# Patient Record
Sex: FEMALE | Race: WHITE | NOT HISPANIC OR LATINO | Employment: UNEMPLOYED | URBAN - METROPOLITAN AREA
[De-identification: names, ages, dates, MRNs, and addresses within clinical notes are randomized per-mention and may not be internally consistent; named-entity substitution may affect disease eponyms.]

---

## 2017-04-13 ENCOUNTER — GENERIC CONVERSION - ENCOUNTER (OUTPATIENT)
Dept: OTHER | Facility: OTHER | Age: 52
End: 2017-04-13

## 2018-01-01 ENCOUNTER — APPOINTMENT (OUTPATIENT)
Dept: RADIOLOGY | Facility: CLINIC | Age: 53
End: 2018-01-01
Payer: COMMERCIAL

## 2018-01-01 ENCOUNTER — OFFICE VISIT (OUTPATIENT)
Dept: URGENT CARE | Facility: CLINIC | Age: 53
End: 2018-01-01
Payer: COMMERCIAL

## 2018-01-01 ENCOUNTER — TRANSCRIBE ORDERS (OUTPATIENT)
Dept: RADIOLOGY | Facility: CLINIC | Age: 53
End: 2018-01-01

## 2018-01-01 ENCOUNTER — HOSPITAL ENCOUNTER (EMERGENCY)
Facility: HOSPITAL | Age: 53
End: 2018-07-20
Attending: EMERGENCY MEDICINE | Admitting: EMERGENCY MEDICINE
Payer: COMMERCIAL

## 2018-01-01 VITALS — OXYGEN SATURATION: 99 % | HEART RATE: 102 BPM

## 2018-01-01 VITALS
TEMPERATURE: 97.4 F | RESPIRATION RATE: 16 BRPM | SYSTOLIC BLOOD PRESSURE: 142 MMHG | DIASTOLIC BLOOD PRESSURE: 79 MMHG | OXYGEN SATURATION: 97 % | HEART RATE: 81 BPM

## 2018-01-01 DIAGNOSIS — K21.00 GASTROESOPHAGEAL REFLUX DISEASE WITH ESOPHAGITIS: Primary | ICD-10-CM

## 2018-01-01 DIAGNOSIS — R06.02 SOB (SHORTNESS OF BREATH): Primary | ICD-10-CM

## 2018-01-01 DIAGNOSIS — J44.9 COPD (CHRONIC OBSTRUCTIVE PULMONARY DISEASE) (HCC): ICD-10-CM

## 2018-01-01 DIAGNOSIS — R06.02 SOB (SHORTNESS OF BREATH): ICD-10-CM

## 2018-01-01 DIAGNOSIS — I46.8 CARDIAC ARREST DUE TO RESPIRATORY DISORDER (HCC): Primary | ICD-10-CM

## 2018-01-01 DIAGNOSIS — J98.9 CARDIAC ARREST DUE TO RESPIRATORY DISORDER (HCC): Primary | ICD-10-CM

## 2018-01-01 PROCEDURE — 99213 OFFICE O/P EST LOW 20 MIN: CPT | Performed by: FAMILY MEDICINE

## 2018-01-01 PROCEDURE — 92950 HEART/LUNG RESUSCITATION CPR: CPT

## 2018-01-01 PROCEDURE — 71046 X-RAY EXAM CHEST 2 VIEWS: CPT

## 2018-01-01 PROCEDURE — 96375 TX/PRO/DX INJ NEW DRUG ADDON: CPT

## 2018-01-01 PROCEDURE — 99285 EMERGENCY DEPT VISIT HI MDM: CPT

## 2018-01-01 PROCEDURE — 96374 THER/PROPH/DIAG INJ IV PUSH: CPT

## 2018-01-01 RX ORDER — ALBUTEROL SULFATE 2.5 MG/3ML
SOLUTION RESPIRATORY (INHALATION)
COMMUNITY
Start: 2015-12-23

## 2018-01-01 RX ORDER — PREDNISONE 10 MG/1
TABLET ORAL DAILY
COMMUNITY

## 2018-01-01 RX ORDER — EPINEPHRINE 0.1 MG/ML
SYRINGE (ML) INJECTION CODE/TRAUMA/SEDATION MEDICATION
Status: COMPLETED | OUTPATIENT
Start: 2018-01-01 | End: 2018-01-01

## 2018-01-01 RX ORDER — ESOMEPRAZOLE MAGNESIUM 40 MG/1
40 CAPSULE, DELAYED RELEASE ORAL
Qty: 42 CAPSULE | Refills: 0 | OUTPATIENT
Start: 2018-01-01

## 2018-01-01 RX ORDER — AMIODARONE HYDROCHLORIDE 50 MG/ML
INJECTION, SOLUTION INTRAVENOUS CODE/TRAUMA/SEDATION MEDICATION
Status: COMPLETED | OUTPATIENT
Start: 2018-01-01 | End: 2018-01-01

## 2018-01-01 RX ORDER — MOMETASONE FUROATE 50 UG/1
SPRAY, METERED NASAL
COMMUNITY
Start: 2015-12-10

## 2018-01-01 RX ADMIN — EPINEPHRINE 1 MG: 0.1 INJECTION, SOLUTION ENDOTRACHEAL; INTRACARDIAC; INTRAVENOUS at 08:13

## 2018-01-01 RX ADMIN — EPINEPHRINE 1 MG: 0.1 INJECTION, SOLUTION ENDOTRACHEAL; INTRACARDIAC; INTRAVENOUS at 08:19

## 2018-01-01 RX ADMIN — EPINEPHRINE 1 MG: 0.1 INJECTION, SOLUTION ENDOTRACHEAL; INTRACARDIAC; INTRAVENOUS at 08:06

## 2018-01-01 RX ADMIN — SODIUM BICARBONATE 50 MEQ: 84 INJECTION, SOLUTION INTRAVENOUS at 08:06

## 2018-01-01 RX ADMIN — AMIODARONE HYDROCHLORIDE 300 MG: 50 INJECTION, SOLUTION INTRAVENOUS at 08:18

## 2018-01-01 RX ADMIN — EPINEPHRINE 1 MG: 0.1 INJECTION, SOLUTION ENDOTRACHEAL; INTRACARDIAC; INTRAVENOUS at 08:22

## 2018-01-01 RX ADMIN — SODIUM BICARBONATE 50 MEQ: 84 INJECTION, SOLUTION INTRAVENOUS at 08:16

## 2018-04-21 NOTE — PROGRESS NOTES
[facility] - Patient Visit Note  Glynn Seip 45 y o  female MRN: [id number]      Assessment / Plan:  Gastroesophageal reflux disease with esophagitis [K21 0]  1  Gastroesophageal reflux disease with esophagitis  esomeprazole (NexIUM) 40 MG capsule     Reason For Visit / Chief Complaint  Chief Complaint   Patient presents with    Heartburn      x 3 hours             Hailey Rucker Discussion:  Patient will take Nexium generic 40 milligrams twice a day taper back her steroid to [id number] and getting contact with her primary care physician during that time  She will avoid all acidic type foods anything that is over hot and to cold  She will have a gastroscope ventrally even though she had a nasal endoscopy was explained to her that the gastroscope is much more diagnostic  She will be speaking to her primary care doctor about all of the same her  was with her and understands all the instructions given     HPI:  Glynn Seip is a 45 y o  female Patient           Who  Presents with upper epigastric discomfort and GERD symptoms  Patient states she has had a history of GERD in the past   In fact in the last 2 days has been getting hiccups every time she eats  Today prior to coming to the office she had 4 relates 2 Tums and had been drinking baking soda water for relief of the esophagitis associated with her LEXY D  When in the office she was given Maalox x2 containers with good relief  She stated she had never tried a proton pump inhibitor or any of the antacids in prescription  She had been to her doctor this same day and in fact did not have the GERD at that time she was being seen for her breathing problems and by a pulmonologist but then she added that she has been on steroids [id number] for the last 10 days  She notes that this is when her GERD started to act up         Problem List     Hypoglycemia Historical Information   Past Medical History:   Diagnosis Date    Allergic     Asthma     GERD (gastroesophageal reflux disease)      No past surgical history on file  Social History   History   Alcohol use Not on file     History   Drug use: Unknown     History   Smoking Status    Not on file   Smokeless Tobacco    Not on file     No family history on file        ALLERGIES:         Allergies   Allergen Reactions    Ciprofloxacin     Penicillin G Hives    Penicillins     Shellfish-Derived Products Hives       MEDS:    Current Outpatient Prescriptions:     albuterol (2 5 mg/3 mL) 0 083 % nebulizer solution, , Disp: , Rfl:     mometasone (NASONEX) 50 mcg/act nasal spray, , Disp: , Rfl:     predniSONE 10 mg tablet, Take by mouth daily, Disp: , Rfl:     esomeprazole (NexIUM) 40 MG capsule, Take 1 capsule (40 mg total) by mouth 2 (two) times a day before meals, Disp: 42 capsule, Rfl: 0    FACILITY ADMINISTERED MEDS:        REVIEW OF SYSTEMS    GENERAL: NEGATIVE for:  Generalized Fatigue                             Chills                              Fever                             Myalgias     OPTHALMIC: NEGATIVE for:  Diplopia                            Scotomata                            Visual Changes                            Blurred Vision     ENT:  EARS NEGATIVE for:  Hearing Difficulty                            Tinnitus                            Vertigo                            Dizziness                            Ear Pain                            Ear Drainage               NOSE NEGATIVE for:  Nasal Congestion                            Nasal Discharge                            Sinus Pain / Pressure               THROAT Positive for:  Sore Throat / Throat Pain                            Difficulty Swallowing     RESPIRATORY: Positive for:  Cough                            Wheezing                                 CARDIOVASCULAR: NEGATIVE for:  Chest Pain SOB (cardiac Related)                             Dyspnea on Exertion                             Orthopnea                             PND                             Leg Edema                             Palpitations                               Irregularities/rythym                       CURRENT VITALS:   Blood Pressure: 142/79 (04/20/18 1734)  Pulse: 81 (04/20/18 1734)  Temperature: (!) 97 4 °F (36 3 °C) (04/20/18 1734)  Respirations: 16 (04/20/18 1734)  SpO2: 97 % (04/20/18 1734)  /79   Pulse 81   Temp (!) 97 4 °F (36 3 °C)   Resp 16   SpO2 97%       PHYSICAL EXAM:         General Appearance:    Alert, cooperative, no apparent distress, appears stated age     Oriented x3    Head:    Normocephalic, without obvious abnormality, atraumatic   Eyes:      EOM's intact,      JUNIOR,        conjunctiva/corneas clear,          fundi not visualized well   Ears:     Normal external ear canals     Tm right side  Normal     Tm left side    Normal       Nose:   Nares normal externally, septum midline,     mucosa normal,     No anterior drainage         Sinuses   with out   tenderness to palpation / percussion     Throat:   Lips, mucosa, and tongue normal       Anterior pharynx   Normal      Posterior pharynx   Normal      No exudate obvious       Neck:   Supple, symmetrical, trachea midline and moveable    Normal thyroid click present    No carotid bruits appreciated        Lymphatics:     Adenopathy in anterior cervical chain  Normal    Adenopathy in posterior cervical chain   Normal     Lungs:   Scattered rhonchi and wheeze are noted in the lungs fields bilaterally     Heart[de-identified]    Regular rate and rhythm, S1 and S2 normal,     No S3, S4, audible    No murmurs, rubs      Extremities:     Extremities grossly normal     atraumatic,     no cyanosis or edema        Skin:     Skin color, texture, turgor normal, no rashes or lesions           Abdomen:     Soft,, bowel sounds active all four quadrants,     no masses, no organomegaly    Abdomen is slight to moderately tender in the epigastric area without rebound guarding or rigidity             Follow up at primary care in 2  days    Counseling / Coordination of Care  Total clinic time spent today  15  minutes  Greater than 50% of total time was spent with the patient and / or family counseling and / or coordination of care  Portions of the record may have been created with voice recognition software   Occasional wrong word or "sound a like" substitutions may have occurred due to the inherent limitations of voice recognition software   Read the chart carefully and recognize, using context, where substitutions have occurred

## 2018-04-21 NOTE — PATIENT INSTRUCTIONS
The Nexium has been approved by your [employer] company at 40 milligrams 2 per day as I called your pharmacy  Please continue on sent to use your primary physician  Of do start tapering the steroids slightly and check in with your primary care about further taper but do not discontinue your steroid medication  Do not continue to take or drink liquids that are ascitic or very hot or very cold  You will need a gastroscope please inform your primary physician that this is my recommendation

## 2018-07-20 NOTE — ED NOTES
Pt released by     Family remains at bedside     Bulmaro Villarreal, [address]  07/20/18 1228

## 2018-07-20 NOTE — ED PROVIDER NOTES
History  Chief Complaint   Patient presents with    Cardiac Arrest     Pt called EMS for COPD exacerbation  On arrival pt found in cardiac arrest  ACLS started on scene  45 yr female found to be pulseless and apneic by EMS after a call for shortness of breath  Patient was found sitting slumped over in a chair with an albuterol nebulizer and  Medics arrived at the house at 7:15 a m  which is approximately 45 min prior to arrival but were unable to get in without assistance from police  Patient's initial rhythm was asystole  The medics were unable to intubate and placed a [facility] airway and ran and ACLS protocol  Patient was given several doses of epinephrine prior to arrival   On arrival to ED CPR was performed by a [facility] machine, patient was bagged via [facility] airway, and she remained pulseless        History provided by:  EMS personnel  History limited by:  Acuity of condition  Cardiac Arrest   Witnessed by:  Not witnessed  Time since incident:  45 minutes  Time before BLS initiated:  > 5 minutes  Time before ALS initiated:  > 10 minutes  Condition upon EMS arrival:  Apneic and unresponsive  Pulse:  Absent  Initial cardiac rhythm per EMS:  Asystole  Treatments prior to arrival:  ACLS protocol [facility] airway, not intubated)  Medications given prior to ED:  Epinephrine  Rhythm on admission to ED:  PEA  Associated symptoms: shortness of breath        Prior to Admission Medications   Prescriptions Last Dose Informant Patient Reported? Taking?    albuterol (2 5 mg/3 mL) 0 083 % nebulizer solution   Yes No   esomeprazole (NexIUM) 40 MG capsule   No No   Sig: Take 1 capsule (40 mg total) by mouth 2 (two) times a day before meals   mometasone (NASONEX) 50 mcg/act nasal spray   Yes No   predniSONE 10 mg tablet   Yes No   Sig: Take by mouth daily      Facility-Administered Medications: None       Past Medical History:   Diagnosis Date    Allergic     Asthma     GERD (gastroesophageal reflux disease)        History reviewed  No pertinent surgical history  History reviewed  No pertinent family history  I have reviewed and agree with the history as documented  Social History   Substance Use Topics    Smoking status: Unknown If Ever Smoked    Smokeless tobacco: Not on file    Alcohol use Not on file        Review of Systems   Unable to perform ROS: Acuity of condition   Respiratory: Positive for shortness of breath  Physical Exam  Physical Exam   Constitutional: She appears well-developed and well-nourished  Unresponsive, ACLS in progress, pulseless, bagged via [facility] airway   HENT:   Head: Normocephalic and atraumatic  Eyes: Conjunctivae are normal  No scleral icterus  Pupil is fixed and dilated   Neck: Neck supple  No JVD present  Cardiovascular:   Pulseless with PEA on monitor   Pulmonary/Chest:   Bag, equal breath sounds bilaterally, noted definitive rales or wheeze   Abdominal: Soft  She exhibits distension  Musculoskeletal: She exhibits no edema or deformity  Neurological:   Unresponsive   Skin: Skin is warm and dry  She is not diaphoretic     Psychiatric:   Unresponsive       Vital Signs  ED Triage Vitals [07/20/18 0821]   Temp Pulse Resp BP SpO2   -- 102 -- -- 99 %      Temp src Heart Rate Source Patient Position - Orthostatic VS BP Location FiO2 (%)   -- -- -- -- --      Pain Score       --           Vitals:    07/20/18 0821   Pulse: 102       Visual Acuity  Visual Acuity      Most Recent Value   L Pupil Size (mm)  7   R Pupil Size (mm)  7          ED Medications  Medications   EPINEPHrine (ADRENALIN) 1 mg/10 mL injection (1 mg Intravenous Given 7/20/18 9573)   sodium bicarbonate 8 4 % injection (50 mEq Intravenous Given 7/20/18 0816)   amiodarone 150 mg/3 mL injection (300 mg Intravenous Given 7/20/18 0818)       Diagnostic Studies  Results Reviewed     None                 No orders to display              Procedures  Procedures       Phone Contacts  ED Phone Contact    ED Course  ED Course as    0930 Pt was pronounced at 8:24am   I spoke with her  in ED  PCP Dr Carmen Hills    [phone] On arrival patient was in [place]  ACLS continued epinephrine and bicarb  I removed the [facility] airway and intubated the patient on the 1st attempt with a 7 5 [place] [employer] ET tube with good color change and equal breath sounds bilaterally  Patient did go into VFib 1 time and was defibrillated into PDA rhythm  Amiodarone was given  Patient did regain spontaneous pulses wants to for less than 1 min  ACLS was continued and then the code was called at 8:24 a m  [employer] Total known down time at this time was at the 70 min    1037 Final or the  was notified by the nurses    5331 Death certificate signed as pronouncing physician                                MDM  The patient presented with a condition in which there was a high probability of imminent or life-threatening deterioration, and critical care services (excluding separately billable procedures) totalled 30-74 minutes (30 min)  Disposition  Final diagnoses:   Cardiac arrest due to respiratory disorder ([facility] )   COPD (chronic obstructive pulmonary disease) ([facility] )     Time reflects when diagnosis was documented in both MDM as applicable and the Disposition within this note     Time User Action Codes Description Comment    2018 10:38 AM Tasia Ruvalcaba [J98 9,  I46 8] Cardiac arrest due to respiratory disorder ([facility] )     2018 10:38 AM Tasia Ruvalcaba [J44 9] COPD (chronic obstructive pulmonary disease) [facility])       ED Disposition     ED Disposition Condition Comment            Follow-up Information    None         Patient's Medications   Discharge Prescriptions    No medications on file     No discharge procedures on file      ED Provider  Electronically Signed by           Earlene Hodges MD  18 [phone]

## 2018-07-20 NOTE — ED NOTES
Pt arrived in PEA  Pt had circumoral cyanosis, firm distended abd, and pupils were fixed and dilated bilaterally  See Treatment PTA for EMS interventions       Beti Dunn RN  07/20/18 8321

## 2018-07-20 NOTE — ED NOTES
christian carreno notified of death at [facility] 1729, [address]  07/20/18 523 [facility] [address], [address] [facility]  07/20/18 1352

## 2018-07-20 NOTE — ED NOTES
[facility] notified    Spoke with Celio Stock, reports pt will be suitable for tissue donation, will call the family     Corky Melo RN  07/20/18 7675

## 2018-07-20 NOTE — ED PROCEDURE NOTE
PROCEDURE  Intubation  Date/Time: 7/20/2018 10:49 AM  Performed by: Sarika Ramirez  Authorized by: Sarika Ramirez     Patient location:  ED  Other Assisting Provider: No    Consent:     Consent obtained:  Emergent situation  Universal protocol:     Patient identity confirmed: Anonymous protocol, patient vented/unresponsive  Pre-procedure details:     Patient status:  Unresponsive    Paralytics:  None  Indications:     Indications for intubation: respiratory failure and other (comment)      Indications for intubation comment:  Cardiac arrest  Procedure details:     Preoxygenation:  Bag valve mask    CPR in progress: yes      Intubation method:  Oral    Oral intubation technique:  Direct    Laryngoscope blade: Mac 3    Tube size (mm):  7 5    Tube type:  Cuffed    Number of attempts:  1    Cricoid pressure: yes      Tube visualized through cords: yes    Placement assessment:     Tube secured with:   Adhesive tape    Breath sounds:  Equal    Placement verification: chest rise           Nataliia Ibarra MD  07/20/18 1052